# Patient Record
Sex: FEMALE | Race: OTHER | Employment: PART TIME | URBAN - METROPOLITAN AREA
[De-identification: names, ages, dates, MRNs, and addresses within clinical notes are randomized per-mention and may not be internally consistent; named-entity substitution may affect disease eponyms.]

---

## 2018-04-27 PROCEDURE — 99284 EMERGENCY DEPT VISIT MOD MDM: CPT

## 2018-04-28 ENCOUNTER — APPOINTMENT (OUTPATIENT)
Dept: GENERAL RADIOLOGY | Facility: HOSPITAL | Age: 28
End: 2018-04-28
Attending: EMERGENCY MEDICINE
Payer: MEDICAID

## 2018-04-28 ENCOUNTER — HOSPITAL ENCOUNTER (EMERGENCY)
Facility: HOSPITAL | Age: 28
Discharge: HOME | End: 2018-04-28
Attending: EMERGENCY MEDICINE
Payer: MEDICAID

## 2018-04-28 VITALS
BODY MASS INDEX: 43.05 KG/M2 | RESPIRATION RATE: 18 BRPM | HEART RATE: 99 BPM | DIASTOLIC BLOOD PRESSURE: 84 MMHG | OXYGEN SATURATION: 99 % | SYSTOLIC BLOOD PRESSURE: 133 MMHG | HEIGHT: 61 IN | WEIGHT: 228 LBS | TEMPERATURE: 98 F

## 2018-04-28 DIAGNOSIS — S93.401A SPRAIN OF RIGHT ANKLE, UNSPECIFIED LIGAMENT, INITIAL ENCOUNTER: Primary | ICD-10-CM

## 2018-04-28 PROCEDURE — 73610 X-RAY EXAM OF ANKLE: CPT | Performed by: EMERGENCY MEDICINE

## 2018-04-28 PROCEDURE — 81025 URINE PREGNANCY TEST: CPT

## 2018-04-28 PROCEDURE — 73630 X-RAY EXAM OF FOOT: CPT | Performed by: EMERGENCY MEDICINE

## 2018-04-28 RX ORDER — IBUPROFEN 600 MG/1
600 TABLET ORAL EVERY 8 HOURS PRN
Qty: 20 TABLET | Refills: 0 | Status: SHIPPED | OUTPATIENT
Start: 2018-04-28 | End: 2018-05-05

## 2018-04-28 RX ORDER — HYDROCODONE BITARTRATE AND ACETAMINOPHEN 5; 325 MG/1; MG/1
1 TABLET ORAL ONCE
Status: COMPLETED | OUTPATIENT
Start: 2018-04-28 | End: 2018-04-28

## 2018-04-28 NOTE — ED PROVIDER NOTES
Patient Seen in: [facility] AND [facility] Emergency Department    History   Patient presents with:   Ankle Injury    Stated Complaint: R ankle injury    HPI    59-year-old female with history of cholecystitis and anemia here with complaints of right ankle pain × complaint: R ankle injury  Other systems are as noted in HPI. Constitutional and vital signs reviewed. All other systems reviewed and negative except as noted above.     Physical Exam   ED Triage Vitals  BP: 133/84 [04/28/18 0000]  Pulse: 99 [04/27/18 indicating adequate oxygenation. PROCEDURES:  Pain Control  The patient was offered pain medication in the Emergency Department. Analgesia was given.     PROCEDURE:  Splint application  A stirrup splint was applied to the patient by a tech and adjusted of his ED evaluation.     - pt comfortable with d/c at this time, will d/c pt home now with Rx for norco, pt to f/u with Dr. Lexa Marquez in 2 days or return to ED sooner if symptoms worsen including fevers, chills, vomiting, pt expresses understanding and agrees

## 2018-04-28 NOTE — ED NOTES
Care assumed from triage. Patient presents with c/o R ankle pain and swelling s/p mechanical fall this am. Pt states she tripped on the sidewalk and \"twisted\" her ankle. + edema, CMS intact, peripheral pulses equal bilaterally.  Limited ROM and weight wili

## 2018-04-28 NOTE — ED NOTES
Discharged home with plan to follow up with PCP as indicated. Alert and interactive. Hemodynamically stable. Agrees with proposed care plan. Ambulates to exit with steady gait via crutches.

## 2019-02-22 ENCOUNTER — HOSPITAL (OUTPATIENT)
Dept: OTHER | Age: 29
End: 2019-02-22

## 2019-02-22 ENCOUNTER — HOSPITAL (OUTPATIENT)
Dept: OTHER | Age: 29
End: 2019-02-22
Attending: EMERGENCY MEDICINE

## 2019-02-22 LAB
ALBUMIN SERPL-MCNC: 3.6 GM/DL (ref 3.6–5.1)
ALBUMIN/GLOB SERPL: 0.8 {RATIO} (ref 1–2.4)
ALP SERPL-CCNC: 117 UNIT/L (ref 45–117)
ALT SERPL-CCNC: 95 UNIT/L
AMPHETAMINES UR QL SCN>500 NG/ML: NEGATIVE
ANALYZER ANC (IANC): NORMAL
ANION GAP SERPL CALC-SCNC: 16 MMOL/L (ref 10–20)
APAP SERPL-MCNC: <2 MCG/ML (ref 10–30)
APPEARANCE UR: ABNORMAL
AST SERPL-CCNC: 37 UNIT/L
BACTERIA #/AREA URNS HPF: ABNORMAL /HPF
BARBITURATES UR QL SCN>200 NG/ML: NEGATIVE
BASOPHILS # BLD: 0.1 THOUSAND/MCL (ref 0–0.3)
BASOPHILS NFR BLD: 1 %
BENZODIAZ UR QL SCN>200 NG/ML: NEGATIVE
BILIRUB SERPL-MCNC: 0.3 MG/DL (ref 0.2–1)
BILIRUB UR QL: NEGATIVE
BUN SERPL-MCNC: 9 MG/DL (ref 6–20)
BUN/CREAT SERPL: 13 (ref 7–25)
BZE UR QL SCN>150 NG/ML: NEGATIVE
CALCIUM SERPL-MCNC: 9.2 MG/DL (ref 8.4–10.2)
CANNABINOIDS UR QL SCN>50 NG/ML: POSITIVE
CHLORIDE: 105 MMOL/L (ref 98–107)
CO2 SERPL-SCNC: 26 MMOL/L (ref 21–32)
COLOR UR: YELLOW
CREAT SERPL-MCNC: 0.67 MG/DL (ref 0.51–0.95)
DIFFERENTIAL METHOD BLD: NORMAL
EOSINOPHIL # BLD: 0.1 THOUSAND/MCL (ref 0.1–0.5)
EOSINOPHIL NFR BLD: 1 %
ERYTHROCYTE [DISTWIDTH] IN BLOOD: 13.4 % (ref 11–15)
ETHANOL SERPL-MCNC: 3 MG/DL
GLOBULIN SER-MCNC: 4.4 GM/DL (ref 2–4)
GLUCOSE SERPL-MCNC: 114 MG/DL (ref 65–99)
GLUCOSE UR-MCNC: NEGATIVE MG/DL
HEMATOCRIT: 41.5 % (ref 36–46.5)
HGB BLD-MCNC: 13.7 GM/DL (ref 12–15.5)
HGB UR QL: NEGATIVE
HYALINE CASTS #/AREA URNS LPF: ABNORMAL /LPF (ref 0–5)
IMM GRANULOCYTES # BLD AUTO: 0 THOUSAND/MCL (ref 0–0.2)
IMM GRANULOCYTES NFR BLD: 0 %
KETONES UR-MCNC: NEGATIVE MG/DL
LEUKOCYTE ESTERASE UR QL STRIP: NEGATIVE
LYMPHOCYTES # BLD: 3 THOUSAND/MCL (ref 1–4.8)
LYMPHOCYTES NFR BLD: 30 %
MCH RBC QN AUTO: 28.7 PG (ref 26–34)
MCHC RBC AUTO-ENTMCNC: 33 GM/DL (ref 32–36.5)
MCV RBC AUTO: 87 FL (ref 78–100)
MICROSCOPIC (MT): ABNORMAL
MONOCYTES # BLD: 0.6 THOUSAND/MCL (ref 0.3–0.9)
MONOCYTES NFR BLD: 6 %
NEUTROPHILS # BLD: 6.3 THOUSAND/MCL (ref 1.8–7.7)
NEUTROPHILS NFR BLD: 62 %
NEUTS SEG NFR BLD: NORMAL %
NITRITE UR QL: POSITIVE
NRBC (NRBCRE): 0 /100 WBC
OPIATES UR QL SCN>300 NG/ML: NEGATIVE
PCP UR QL SCN>25 NG/ML: NEGATIVE
PH UR: 6 UNIT (ref 5–7)
PLATELET # BLD: 395 THOUSAND/MCL (ref 140–450)
POTASSIUM SERPL-SCNC: 3.5 MMOL/L (ref 3.4–5.1)
PROT SERPL-MCNC: 8 GM/DL (ref 6.4–8.2)
PROT UR QL: NEGATIVE MG/DL
RBC # BLD: 4.77 MILLION/MCL (ref 4–5.2)
RBC #/AREA URNS HPF: ABNORMAL /HPF (ref 0–3)
SALICYLATES SERPL-MCNC: <2.8 MG/DL
SODIUM SERPL-SCNC: 143 MMOL/L (ref 135–145)
SP GR UR: 1.02 (ref 1–1.03)
SPECIMEN SOURCE: ABNORMAL
SQUAMOUS #/AREA URNS HPF: ABNORMAL /HPF (ref 0–5)
UROBILINOGEN UR QL: 0.2 MG/DL (ref 0–1)
WBC # BLD: 10.1 THOUSAND/MCL (ref 4.2–11)
WBC #/AREA URNS HPF: ABNORMAL /HPF (ref 0–5)

## 2019-07-10 PROBLEM — F33.2 SEVERE EPISODE OF RECURRENT MAJOR DEPRESSIVE DISORDER, WITHOUT PSYCHOTIC FEATURES (HCC): Status: ACTIVE | Noted: 2019-07-10

## 2019-07-10 PROBLEM — T43.222A: Status: ACTIVE | Noted: 2019-07-10

## 2019-07-10 PROBLEM — T43.222A INTENTIONAL SELECTIVE SEROTONIN REUPTAKE INHIBITOR OVERDOSE (HCC): Status: ACTIVE | Noted: 2019-07-10

## 2019-07-11 NOTE — BH PROGRESS NOTE
Behavioral Health Note  CHIEF COMPLAINT  Overdose    REASON FOR ADMISSION  Overdose    SOCIAL HISTORY  Saqib Mott lives with her children and is unemployed. She smokes a pack per day and denies alcohol.  She reports that she smokes marijuana 2-3x weeky for anx coorperative  Behavior: calm  Speech: normal rate, rhythm, and volume  Mood: depressed, anxious  Affect: congruent  Conscious/Orientation: alert and oriented x3, fair attention  Thought process: linear  Thought content: no abnormality  Perceptions: no purdy

## 2019-07-11 NOTE — BH LEVEL OF CARE ASSESSMENT
Level of Care Assessment Note    General Questions  Why are you here?: \"I feel worthless. \"   Precipitating Events: Pt admits to taking 80 20mg citalopram in an attempt to end her life.  Pt sent her ex- a text stating \"don't bring the boys home bec do anything, or prepared to do anything to end your life? (lifetime): Yes  7.  How long ago did you do any of these?: Within the last three months  Score -  OV: 13 - High Risk   Describe : Pt reports taking 80 of her 20mg citalopram in an attempt to end h observed  Delusions: No problems reported or observed  Depression Symptoms: Appetite change;Sleep disturbance; Feelings of worthlessness; Feelings of hopelessess; Feelings of helplessness  Depression Description: Pt reports poor sleep, only getting about 3 ho alcohol? : (Pt admits to drinking socially on occasion.)       Illicit and Prescription Drug Use  Which if any illicit/prescription drugs have you used/abused?: Cannabis    Cannabis Use  Age at first use?: Teenager   Route: Smoked  Average current amount u for reporting to authorities    General Appearance  Characteristics: Appropriate clothing;Good hygiene  Eye Contact: Indirect  Psychomotor Behavior  Gait/Movement: Other (comment)(Pt laying in hospital bed)  Abnormal movements: Tics  Posture: Slouched  Rat making  Protective Factors: Unknown at this time     Motivational Stage of Change  Motivational Stage of Change: Pre-Contemplative    Level of Care Recommendations  Consulted with: Dr. Pierce   Level of Care Recommendation: Inpatient Acute Care  Unit: Dong Ruano

## 2019-07-11 NOTE — PAYOR COMM NOTE
--------------  ADMISSION REVIEW     Payor: Saqib Redmond #:  [id number]  Authorization Number: [id number]    Admit date: 7/10/19  Admit time: 2213       Admitting Physician: Glenn Ahmadi MD  Attending Physi oriented to person, place, and time. She has normal strength. She displays tremor. She displays no atrophy. No cranial nerve deficit or sensory deficit. She displays a negative Romberg sign. She displays no seizure activity.    Marked tremulousness   Skin: Please view results for these tests on the individual orders.    ETHYL ALCOHOL   SALICYLATE, SERUM   ACETAMINOPHEN (TYLENOL), S   RAINBOW DRAW BLUE   RAINBOW DRAW LAVENDER   RAINBOW DRAW LIGHT GREEN   RAINBOW DRAW GOLD   URINE CULTURE, ROUTINE     EKG received 2 doses of Ativan for anxiety and shakiness, so she was sleepy, arousable. She would wake up and was able to provide me with short 1 or 2-word answers, but not able to provide a full history.   The emergency room physician spoke with SHERRILL ALDANA followed all commands. There was no focal motor or sensory deficits. PSYCHIATRIC:  Her mood and affect were withdrawn, but cooperative. BACK:  There was no costovertebral angle tenderness noted.     LABORATORY DATA:  Glucose was 124, sodium 140, potassiu MD  d: 07/11/2019 06:39:20  t: 07/11/2019 06:59:41  [facility] [id number]  SYN/           MEDICATIONS ADMINISTERED IN LAST 1 DAY:  CefTRIAXone Sodium (ROCEPHIN) 1 g in sodium chloride 0.9% 100 mL MBP/ADD-vantage     Date Action Dose Route User    7/11/ Route User    7/10/2019 2245 Bolus from Bag 1000 mL Intravenous Ruddy Pitt, [facility]        07/11/19 0600 — 80 P 29Abnormal R  122/97Abnormal  97 % — CPAP — LP   07/11/19 0500 — 79 22 118/88 95 % — CPAP — LP   07/11/19 0400 98.5 °F (36.9 °C) 79 26 108/73

## 2019-07-11 NOTE — ED NOTES
Spoke to Blayne Villa for [employer]. Case number [id number]. Patient to have serial EKG q6h x2. Labs ordered. Cardiac monitor applied. Patient denies pain n/v. Monitor for serotonin syndrome.

## 2019-07-11 NOTE — ED NOTES
Patient c/o tremors. MD Sender aware. Ativan ordered and given. Spoke to [facility] at [address] [facility] control again. She states activated charcoal aquis is an option. MD sender is aware.

## 2019-07-11 NOTE — PROGRESS NOTES
Patient sent to new room. Sitter went with patient. VSS, denies any SOB or chest pain. IVF continued. Double RN skin check done prior to transfer off Unit. Skin check performed by this RN and Maxx Braxton.      Wounds are as follows: none    Will remain avai

## 2019-07-11 NOTE — PLAN OF CARE
Problem: Patient/Family Goals  Goal: Patient/Family Long Term Goal  Description  Patient's Long Term Goal: to verbalize pt is not suicidal    Interventions:  - 24 hour Sitter at bedside.  - Meds, as ordered.    - See additional Care Plan goals for specifi signs, rhythm, and trends  - Monitor for bleeding, hypotension and signs of decreased cardiac output  - Evaluate effectiveness of vasoactive medications to optimize hemodynamic stability  - Monitor arterial and/or venous puncture sites for bleeding and/or

## 2019-07-11 NOTE — PROGRESS NOTES
[facility]    Progress Note    Kadeem Case Patient Status:  Inpatient    1990 MRN [id number]   Location [facility] 2W/SW Attending Philomena Azevedo Day # 1 PCP None Pcp        Subjective:     Constituti await evaluation by Psychiatry for medical management of these conditions. pt needs inpt psych reluctant to go  3. Mild nonfasting hyperglycemia, likely stress related. We will simply follow. 4.       Elevated liver function tests.   Suspect at least Electronically signed on 07/11/2019 at 10:32 by Pee Ware MD  7/11/2019

## 2019-07-11 NOTE — PLAN OF CARE
Problem: Patient/Family Goals  Goal: Patient/Family Short Term Goal  Description  Patient's Short Term Goal: to keep pt safe  Interventions:   - Cont monitoring.  - Seizure Precaution  - See additional Care Plan goals for specific interventions   Outcome: stability  Description  INTERVENTIONS:  - Monitor vital signs, rhythm, and trends  - Monitor for bleeding, hypotension and signs of decreased cardiac output  - Evaluate effectiveness of vasoactive medications to optimize hemodynamic stability  - Monitor ar

## 2019-07-11 NOTE — ED NOTES
Patient brought bottle of citalopram 20mg table with. 10 pills remain in the bottle.  RX was filled 7/1/19

## 2019-07-11 NOTE — PROGRESS NOTES
Poison control called, EKG given, labs given and VS given. Poison control signed off. Case # [id number].

## 2019-07-11 NOTE — ED NOTES
Patient has developed twitching and states that she is feeling hot. RN Estevan Cullen and MD Sender notified.

## 2019-07-11 NOTE — ED PROVIDER NOTES
Patient Seen in: [facility] AND [facility] Emergency Department    History   Patient presents with:  Charles-P (psychiatric)    Stated Complaint: OD    HPI    Patient is 28-year-old female who has a history of bipolar disorder and anemia.   Presents to the emergency Normal rate, regular rhythm and normal heart sounds. Pulmonary/Chest: Effort normal.   Musculoskeletal: Normal range of motion. Neurological: She is alert and oriented to person, place, and time. She has normal strength. She displays tremor.  She displa -----------         ------                     CBC W/ DIFFERENTIAL[[id number]]          Abnormal            Final result                 Please view results for these tests on the individual orders.    ETHYL ALCOHOL   SALICYLATE, SERUM

## 2019-07-11 NOTE — PROGRESS NOTES
Dr Escalona Room confirms that patient may have phone to take care of children and work etc. Vista Gitelman will bring items.

## 2019-07-11 NOTE — H&P
[facility]    PATIENT'S NAME: Rolando Foreman PHYSICIAN: Madelyn Samuel MD   PATIENT ACCOUNT#:   [id number]    LOCATION:  [address] [place] [address] Avenue #:   [id number]       YOB: 1990  ADMISSION DATE:       0 past, status post cholecystectomy. MEDICATIONS:  Prior to admission, citalopram 20 mg daily, Ortho Evra 1 patch once a week. ALLERGIES:  No known drug allergies. FAMILY HISTORY:  The patient's father has diabetes.   Maternal grandmother had breast phosphatase was 137, AST was 103, , total bilirubin 0.4, CPK 75. White count was 11.6 with a hemoglobin of 14 and platelet count of 827,900. There were 70% neutrophils.   Urinalysis was nitrite positive with 7 white cells, 1 red cell, many bacteria

## 2019-07-11 NOTE — PROGRESS NOTES
ADMISSION NOTE    34year old female with BAD and previous suicide attempt presents after ingesting 80 20mg tab citalopram in a suicide attempt . Available medical records partially reviewed. Dictation to follow.     Tres Bennett M.D.  7/10/2019

## 2019-07-11 NOTE — PLAN OF CARE
Received pt from ED last night 2215 after Citalopram OD. P&C in chart. 24 hour Sitter initiated, as ordered. Admission orders received. Pt was initially extremely agitated, tremors noted frequently.  Pulling off monitor and verbalizing wish to leave hospita

## 2019-07-11 NOTE — ED INITIAL ASSESSMENT (HPI)
Patient arrives via EMS after taking 80 pills of 20mg Citalopram. Ex  called EMS. Per medica report patient sent text message to ex  saying \"Dont bring the boys home because I dont want them to see their dead mother. \" patient is alert and o

## 2019-07-12 NOTE — PAYOR COMM NOTE
--------------  CONTINUED STAY REVIEW    Payor: Saqib Redmond #:  [id number]  Authorization Number: [id number]    Admit date: 7/10/19  Admit time: 2213    Discharge planned 7/12 to [address] [place] [address] Physician: Colin Smith

## 2019-07-12 NOTE — CONSULTS
[facility]    Report of Consultation    Kadeem Case Patient Status:  Inpatient    1990 MRN [id number]   Location [facility] 3W/SW Attending Philomena Azevedo Day # 1 PCP None Pcp     Date of Admission: has been taking Celexa 20 mg daily. Patient reported that she transfer from [place] to live with her ex-boyfriend with his kids but that has been very challenging.     The patient admitted that she still feels hopeless and helpless and wish her at time wa Saline Flush 0.9 % injection 3 mL 3 mL Intravenous PRN   Atropine Sulfate 0.1 MG/ML injection 0.5 mg 0.5 mg Intravenous PRN   nitroGLYCERIN (NITROSTAT) SL tab 0.4 mg 0.4 mg Sublingual Q5 Min PRN   Heparin Sodium (Porcine) 5000 UNIT/ML injection 5,000 Units Impression:     1. Intentional overdose of selective serotonin reuptake inhibitor (SSRI), initial encounter ([facility] [address]  2.  Severe episode of recurrent major depressive disorder, without psychotic features ([facility]          Discussed risk and benefit, acknowledg

## 2019-07-12 NOTE — PROGRESS NOTES
Received bed at [facility]. Pt to be transported via ambulance. All paperwork faxed over to intake. Patient made aware of transfer all questions were answered. Sitter remains at bedside until transfer.   Will continue to monitor pt closely

## 2019-07-12 NOTE — PROGRESS NOTES
Iveth Fontaine is a 34year old  single female lives with her 2 children 4 and 7 and their father with history of mood disorder who overdose on 80 tablet of Celexa 20 mg and attempt to kill herself.   The patient seen today for over 35-minute, fol (Active prior to today's visit):    Current Facility-Administered Medications:  ARIPiprazole (ABILIFY) tab 2 mg 2 mg Oral Nightly   CefTRIAXone Sodium (ROCEPHIN) 1 g in sodium chloride 0.9% 100 mL MBP/ADD-vantage 1 g Intravenous Daily   LORazepam (ATIVAN) selective serotonin reuptake inhibitor (SSRI), initial encounter ([facility] [address]  2. Bipolar disorder type I recent episode depression.              Discussed risk and benefit, acknowledging the current symptom and severity.   The patient with history of mood disor LORazepam 0.5 MG Oral Tab 12 tablet 0     Sig: Take 1 tablet (0.5 mg total) by mouth every 4 (four) hours as needed for Anxiety.  Watch drowsiness no alcohol   • cephALEXin 500 MG Oral Cap 21 capsule 0     Sig: Take 1 capsule (500 mg total) by mouth 3 (thre

## 2019-07-12 NOTE — DISCHARGE SUMMARY
Dc summary#[id number]  > 30 min spent on [address] Discharge Diagnoses: celexa od    Lace+ Score: 17  59-90 High Risk  29-58 Medium Risk  0-28   Low Risk. TCM Follow-Up Recommendation:  LACE > 58:  High Risk of readmission after discharge from the hos

## 2019-07-12 NOTE — BH PROGRESS NOTE
Behavioral Health Note  CHIEF COMPLAINT  Overdose     REASON FOR ADMISSION  Overdose     SOCIAL HISTORY  Verito Diamond lives with her children and is unemployed. She smokes a pack per day and denies alcohol.  She reports that she smokes marijuana 2-3x weeky for a with transfer this evening [id number].    Jihan Delay LCSW

## 2019-07-12 NOTE — PLAN OF CARE
Problem: ANXIETY  Goal: Will report anxiety at manageable levels  Description  INTERVENTIONS:  - Administer medication as ordered  - Teach and rehearse alternative coping skills  - Provide emotional support with 1:1 interaction with staff  Outcome: Progr

## 2019-07-13 NOTE — PAYOR COMM NOTE
--------------  DISCHARGE REVIEW    Payor: Saqib Redmond #:  [id number]  Authorization Number: [id number]    Admit date: 7/10/19  Admit time:  2213  Discharge Date: 7/12/2019  8:17 PM     Admitting Physician: Gala Lester

## 2019-07-15 NOTE — DISCHARGE SUMMARY
[facility]    PATIENT'S NAME: Jesi Oliveira   ATTENDING PHYSICIAN: Price Azevedo MD   PATIENT ACCOUNT#:   [id number]    LOCATION:  [address] [place] [address] #:   [id number]       YOB: 1990  ADMISSION DATE:       07/10 [facility] appears to be the most likely location. Awaiting the actual details. 2.   Morbid obesity. BMI of 41.57. Needs KARRIE workup. 3.   Elevated liver functions with fatty liver, perhaps related to Celexa. We will observe.   4.   Depression and bipolar af

## 2021-05-03 ENCOUNTER — LAB ENCOUNTER (OUTPATIENT)
Dept: LAB | Age: 31
End: 2021-05-03
Attending: FAMILY MEDICINE
Payer: COMMERCIAL

## 2021-05-03 DIAGNOSIS — F32.A ANXIETY AND DEPRESSION: ICD-10-CM

## 2021-05-03 DIAGNOSIS — F41.9 ANXIETY AND DEPRESSION: ICD-10-CM

## 2021-05-03 DIAGNOSIS — Z82.49 FAMILY HISTORY OF PREMATURE CAD: ICD-10-CM

## 2021-05-03 DIAGNOSIS — Z13.1 SCREENING FOR DIABETES MELLITUS: ICD-10-CM

## 2021-05-03 DIAGNOSIS — Z00.00 WELL ADULT EXAM: ICD-10-CM

## 2021-05-03 PROBLEM — Z86.59 HISTORY OF SUICIDAL IDEATION: Status: ACTIVE | Noted: 2021-05-03

## 2021-05-03 PROBLEM — Z90.49 HISTORY OF CHOLECYSTECTOMY: Status: ACTIVE | Noted: 2021-05-03

## 2021-05-03 PROBLEM — F17.200 CURRENT EVERY DAY SMOKER: Status: ACTIVE | Noted: 2021-05-03

## 2021-05-03 PROBLEM — F12.90 MARIJUANA USE: Status: ACTIVE | Noted: 2021-05-03

## 2021-05-03 PROBLEM — R11.2 NAUSEA AND VOMITING: Status: ACTIVE | Noted: 2021-05-03

## 2021-05-03 PROBLEM — Z80.0 FH: COLON CANCER: Status: ACTIVE | Noted: 2021-05-03

## 2021-05-03 PROBLEM — R63.4 UNINTENTIONAL WEIGHT LOSS: Status: ACTIVE | Noted: 2021-05-03

## 2021-05-03 PROBLEM — Z86.59 HISTORY OF BIPOLAR DISORDER: Status: ACTIVE | Noted: 2021-05-03

## 2021-05-03 PROBLEM — R19.7 DIARRHEA: Status: ACTIVE | Noted: 2021-05-03

## 2021-05-03 PROCEDURE — 80061 LIPID PANEL: CPT

## 2021-05-03 PROCEDURE — 84443 ASSAY THYROID STIM HORMONE: CPT

## 2021-05-03 PROCEDURE — 83036 HEMOGLOBIN GLYCOSYLATED A1C: CPT

## 2021-05-03 PROCEDURE — 36415 COLL VENOUS BLD VENIPUNCTURE: CPT

## 2021-05-03 PROCEDURE — 85025 COMPLETE CBC W/AUTO DIFF WBC: CPT

## 2021-05-03 PROCEDURE — 93010 ELECTROCARDIOGRAM REPORT: CPT | Performed by: FAMILY MEDICINE

## 2021-05-03 PROCEDURE — 80053 COMPREHEN METABOLIC PANEL: CPT

## 2021-05-03 PROCEDURE — 93005 ELECTROCARDIOGRAM TRACING: CPT

## 2021-05-03 NOTE — PROGRESS NOTES
HPI:    Patient ID: Rosi Howell is a 36year old female.     HPI  Patient presents with:  Establish Care: abnorma PHQ  Other: would like a letter for work  has been off since March 13 for psychiatry reasons   Vomiting: pt states has been happening on a also has recurrent diarrhea for a long time. She has never seen a gastroenterologist.  Denies any blood in stool black stools. She mentions losing significant amount of weight last year between June to December 2020.   She states she used to weigh around Tenderness: There is no abdominal tenderness. Neurological:      Mental Status: She is alert. Psychiatric:         Mood and Affect: Mood is anxious. Speech: Speech is rapid and pressured.          Behavior: Behavior normal.         Thought WITH PLATELET; Future  - LIPID PANEL; Future  - TSH W REFLEX TO FREE T4; Future  - COMP METABOLIC PANEL (14); Future    9. Screening for diabetes mellitus    - HEMOGLOBIN A1C; Future    10. History of suicidal ideation      11.  Encounter for other contrace

## 2021-05-10 ENCOUNTER — OFFICE VISIT (OUTPATIENT)
Dept: FAMILY MEDICINE CLINIC | Facility: CLINIC | Age: 31
End: 2021-05-10
Payer: COMMERCIAL

## 2021-05-10 VITALS
DIASTOLIC BLOOD PRESSURE: 68 MMHG | SYSTOLIC BLOOD PRESSURE: 95 MMHG | HEIGHT: 61 IN | TEMPERATURE: 97 F | BODY MASS INDEX: 38.89 KG/M2 | WEIGHT: 206 LBS | HEART RATE: 89 BPM

## 2021-05-10 DIAGNOSIS — Z00.00 WELL ADULT EXAM: Primary | ICD-10-CM

## 2021-05-10 DIAGNOSIS — Z86.59 HISTORY OF SUICIDAL IDEATION: ICD-10-CM

## 2021-05-10 DIAGNOSIS — F17.200 CURRENT EVERY DAY SMOKER: ICD-10-CM

## 2021-05-10 DIAGNOSIS — R19.7 DIARRHEA, UNSPECIFIED TYPE: ICD-10-CM

## 2021-05-10 DIAGNOSIS — F31.4 SEVERE DEPRESSED BIPOLAR I DISORDER WITHOUT PSYCHOTIC FEATURES (HCC): ICD-10-CM

## 2021-05-10 DIAGNOSIS — E66.9 OBESITY (BMI 30-39.9): ICD-10-CM

## 2021-05-10 DIAGNOSIS — Z80.0 FH: COLON CANCER: ICD-10-CM

## 2021-05-10 DIAGNOSIS — Z82.49 FAMILY HISTORY OF PREMATURE CAD: ICD-10-CM

## 2021-05-10 DIAGNOSIS — R11.2 NAUSEA AND VOMITING, INTRACTABILITY OF VOMITING NOT SPECIFIED, UNSPECIFIED VOMITING TYPE: ICD-10-CM

## 2021-05-10 DIAGNOSIS — Z01.419 ENCOUNTER FOR GYNECOLOGICAL EXAMINATION: ICD-10-CM

## 2021-05-10 DIAGNOSIS — Z90.49 HISTORY OF CHOLECYSTECTOMY: ICD-10-CM

## 2021-05-10 DIAGNOSIS — R63.4 UNINTENTIONAL WEIGHT LOSS: ICD-10-CM

## 2021-05-10 PROBLEM — T43.222A: Status: RESOLVED | Noted: 2019-07-10 | Resolved: 2021-05-10

## 2021-05-10 PROCEDURE — 3008F BODY MASS INDEX DOCD: CPT | Performed by: FAMILY MEDICINE

## 2021-05-10 PROCEDURE — 3074F SYST BP LT 130 MM HG: CPT | Performed by: FAMILY MEDICINE

## 2021-05-10 PROCEDURE — 3078F DIAST BP <80 MM HG: CPT | Performed by: FAMILY MEDICINE

## 2021-05-10 PROCEDURE — 99395 PREV VISIT EST AGE 18-39: CPT | Performed by: FAMILY MEDICINE

## 2021-05-10 RX ORDER — TOPIRAMATE 50 MG/1
TABLET, FILM COATED ORAL
COMMUNITY
Start: 2021-05-05

## 2021-05-10 RX ORDER — TRAZODONE HYDROCHLORIDE 50 MG/1
50 TABLET ORAL NIGHTLY
COMMUNITY
Start: 2021-05-05

## 2021-05-10 NOTE — PROGRESS NOTES
HPI:    Patient ID: Nikkie Leary is a 36year old female. HPI  Patient presents with: Well Adult        Review of Systems   Constitutional: Negative for activity change, appetite change, chills, fatigue, fever and unexpected weight change.    HENT: Laterality Date   •      • CHOLECYSTECTOMY     • HERNIA SURGERY  2013    with mesh   • LAPAROSCOPIC CHOLECYSTECTOMY       Social History    Socioeconomic History      Marital status:       Spouse name: Not on file      Number Organization Meetings:       Marital Status:   Intimate Partner Violence:       Fear of Current or Ex-Partner:       Emotionally Abused:       Physically Abused:       Sexually Abused:   Family History   Problem Relation Age of Onset   • Diabetes Father normal.      Breath sounds: Normal breath sounds. No wheezing. Chest:      Breasts: Breasts are symmetrical.         Right: Normal.         Left: Normal.   Abdominal:      General: Bowel sounds are normal.      Palpations: Abdomen is soft.  There is no ma smoker  Severe depressed bipolar i disorder without psychotic features (hcc)  History of suicidal ideation  Unintentional weight loss  Nausea and vomiting, intractability of vomiting not specified, unspecified vomiting type  Diarrhea, unspecified type  His

## 2021-05-15 ENCOUNTER — TELEPHONE (OUTPATIENT)
Dept: FAMILY MEDICINE CLINIC | Facility: CLINIC | Age: 31
End: 2021-05-15

## 2021-05-15 RX ORDER — METRONIDAZOLE 7.5 MG/G
1 GEL VAGINAL NIGHTLY
Qty: 1 TUBE | Refills: 0 | Status: SHIPPED | OUTPATIENT
Start: 2021-05-15

## 2021-05-15 NOTE — TELEPHONE ENCOUNTER
On call note: Was called on 5/15/21 by patient about questions on medication. Message given to Dr Isabelle Garcia to address as she is in office now. Addressed by Dr Isabelle Garcia.

## 2021-05-18 ENCOUNTER — OFFICE VISIT (OUTPATIENT)
Dept: OBGYN CLINIC | Facility: CLINIC | Age: 31
End: 2021-05-18
Payer: COMMERCIAL

## 2021-05-18 VITALS
WEIGHT: 208 LBS | HEART RATE: 77 BPM | BODY MASS INDEX: 39 KG/M2 | DIASTOLIC BLOOD PRESSURE: 62 MMHG | SYSTOLIC BLOOD PRESSURE: 96 MMHG

## 2021-05-18 DIAGNOSIS — Z30.09 COUNSELING FOR BIRTH CONTROL REGARDING INTRAUTERINE DEVICE (IUD): Primary | ICD-10-CM

## 2021-05-18 PROCEDURE — 99203 OFFICE O/P NEW LOW 30 MIN: CPT | Performed by: OBSTETRICS & GYNECOLOGY

## 2021-05-18 PROCEDURE — 3074F SYST BP LT 130 MM HG: CPT | Performed by: OBSTETRICS & GYNECOLOGY

## 2021-05-18 PROCEDURE — 3078F DIAST BP <80 MM HG: CPT | Performed by: OBSTETRICS & GYNECOLOGY

## 2021-05-18 RX ORDER — MISOPROSTOL 200 UG/1
TABLET ORAL
Qty: 1 TABLET | Refills: 0 | Status: SHIPPED | OUTPATIENT
Start: 2021-05-18

## 2021-05-18 NOTE — H&P
HPI:  The patient is a 37 yo F here to discuss IUD insertion. Had Mirena in the past and wants another one. Monthly cycles with 4 days lfow. Monogamous with partner x 10 years . Had annual with Dr. Remonia Kehr last week.       LPS: 5/10/21 pap neg    Reviewed m file    Social Determinants of Health  Financial Resource Strain:       Difficulty of Paying Living Expenses:   Food Insecurity:       Worried About Running Out of Food in the Last Year:       Ran Out of Food in the Last Year:   Transportation Needs:       constipation  Genitourinary:  denies dysuria, incontinence, abnormal vaginal discharge, vaginal itching  Musculoskeletal:  denies back pain. Skin/Breast:  Denies any breast pain, lumps, or discharge.    Neurological:  denies headaches, extremity weakness

## (undated) NOTE — ED AVS SNAPSHOT
Melanie Kohli   MRN: [id number]    Department:  [facility] Emergency Department   Date of Visit:  4/27/2018           Disclosure     Insurance plans vary and the physician(s) referred by the ER may not be covered by your plan.  Please contact CARE PHYSICIAN AT ONCE OR RETURN IMMEDIATELY TO THE EMERGENCY DEPARTMENT. If you have been prescribed any medication(s), please fill your prescription right away and begin taking the medication(s) as directed.   If you believe that any of the medications

## (undated) NOTE — IP AVS SNAPSHOT
Patient Demographics     Address  [address] [facility] [address] Phone  [phone] [facility])  [phone] [facility] E-mail Address  [email or web address]      Emergency Contact(s)     Name Relation Home Work [address] Mother [phone] Take 1 tablet (2 mg total) by mouth nightly. Watch muscle stiffness , fever   ABIOLA SINGH MD         cephALEXin 500 MG Caps  Commonly known as:  KEFLEX  Next dose due:   Tonight 7/12      Take 1 capsule (500 mg total) by mouth 3 (three) times d Most Recent Value   Patient Weight  99.8 kg (220 lb)      CPAP Settings (Inpatient)       Most Recent Value   Mode  AutoPAP   Interface  Full face mask   Mask size  Large   O2%  21 %         Lab Results Last 24 Hours      COMP METABOLIC PANEL (14) [[id number] MAGNESIUM [[id number]] (Normal)  Resulted: 07/12/19 0850, Result status: Final result   Ordering provider:  Ever Terry MD  07/11/19 6461 Resulting lab:  [facility]    Specimen Information    Type Source Collected On   Blood — 07/12/19 3100 Only the INR (not the Protime value) should be utilized for   the monitoring of oral anticoagulant therapy.      Recommended therapeutic ranges for anticoagulant therapy are   as follows:   2.0 - 3.0 All indications except for mechanical prosthetic   cardia DATE OF EXAMINATION:  07/10/2019. CHIEF COMPLAINT:  Overdose. HISTORY OF PRESENT ILLNESS:  This is a 56-year-old woman with a past medical history of depression and bipolar affective disorder.   She apparently had gotten into an argument with her ex- SOCIAL HISTORY:  The patient is a previous smoker. Drinks alcohol occasionally and has a history of cannabis use.     REVIEW OF SYSTEMS:  The patient kept falling back to sleep, but did deny any cough, sinus complaints, sore throat, nausea, vomiting, diarr EKG revealed sinus rhythm with nonspecific ST abnormality. ASSESSMENT AND PLAN:    1. Overdose of citalopram, approximately 1600 mg. The patient is monitored in the PCU.   She had received several doses of IV Ativan and continued to have uncontrollabl 1. Consult to PsychIATRY [[id number]] ordered by Sender, Rowena Thornton MD at [address] [place] [address]    Report of Consultation    Juve Caballero Patient Status:  Inpatient    1990 MRN [id number]   Location [address] [place] [address] bipolar disorder after admission to psychiatric hospital for suicidal ideation and a plan but no attempt. Patient reported that she start medication and recently has been taking Celexa 20 mg daily.   Patient reported that she transfer from [place] to [facility] Current Medications:    Current Facility-Administered Medications:  CefTRIAXone Sodium (ROCEPHIN) 1 g in sodium chloride 0.9% 100 mL MBP/ADD-vantage 1 g Intravenous Daily   Normal Saline Flush 0.9 % injection 3 mL 3 mL Intravenous PRN   Atropine Sulfate 0. Thought process: linear  Thought content:  Patient admitted intentional suicidal attempt by overdose. Perceptions: no hallucinations  Insight: poor  Judgment: poor    Impression:   Impression:     1.   Intentional overdose of selective serotonin reuptake Urine Culture, Routine Once      Meds This Visit:  Requested Prescriptions      No prescriptions requested or ordered in this encounter           Sher Beckham MD  7/11/2019[GA.1]        Electronically signed by Vesna Campos MD on 7/11/2019  7:

## (undated) NOTE — IP AVS SNAPSHOT
[facility]            (For Outpatient Use Only) Initial Admit Date: 7/10/2019   Inpt/Obs Admit Date: Inpt: 7/10/19 / Obs: N/A   Discharge Date:    Ade Crouch:  [de-identified]   MRN: [de-identified]   CSN: [id number]   CEID: [id number]        [facility] Group Number:  Insurance Type:    Subscriber Name:  Subscriber :    Subscriber ID:  Pt Rel to Subscriber:    Hospital Account Financial Class: Medicaid Advantage    2019